# Patient Record
Sex: MALE | Race: WHITE | HISPANIC OR LATINO | URBAN - METROPOLITAN AREA
[De-identification: names, ages, dates, MRNs, and addresses within clinical notes are randomized per-mention and may not be internally consistent; named-entity substitution may affect disease eponyms.]

---

## 2021-05-11 ENCOUNTER — IMPORTED ENCOUNTER (OUTPATIENT)
Dept: URBAN - METROPOLITAN AREA CLINIC 31 | Facility: CLINIC | Age: 41
End: 2021-05-11

## 2021-05-11 PROBLEM — H11.052: Noted: 2021-05-11

## 2021-05-11 PROBLEM — H11.001: Noted: 2021-05-11

## 2021-05-11 PROCEDURE — 99203 OFFICE O/P NEW LOW 30 MIN: CPT

## 2021-05-11 NOTE — PATIENT DISCUSSION
1.  Pterygium OS --Discussed that pterygium is caused by the cumulative effect of sun exposure over the patient's life. UV protection with the use of sunglasses and/or hat is important to prevent progression. Artificial tears prn. Alternative treatments reviewed including topical steroids and surgical excision for symptomatic and/or progressive growth toward the pupil. The risks and benefits of surgery were discussed including infection bleeding loss of vision recurrence of growth and surgery was recommended. Schedule excision with free graft topical MMC and tissue glue. Can schedule pterygium excision with conjunctival autograft and a glue OSMight consider local Only if desires - no IV sedation - since self pay - discussed. 2. Pterygium OD --Discussed that pterygium is caused by the cumulative effect of sun exposure over the patient's life. UV protection with the use of sunglasses and/or hat is important to prevent progression. Artificial tears prn.   Monitor for possible worsening. might consider pterygium surgery OD later on

## 2022-04-02 ASSESSMENT — VISUAL ACUITY
OS_CC: 20/40
OD_PH: SC 20/20 -1
OD_CC: 20/40
OS_PH: SC 20/20 -1